# Patient Record
Sex: MALE | Race: WHITE | URBAN - METROPOLITAN AREA
[De-identification: names, ages, dates, MRNs, and addresses within clinical notes are randomized per-mention and may not be internally consistent; named-entity substitution may affect disease eponyms.]

---

## 2017-10-21 DIAGNOSIS — B00.9 HSV-2 (HERPES SIMPLEX VIRUS 2) INFECTION: ICD-10-CM

## 2017-10-21 NOTE — LETTER
October 24, 2017    Boni Marshall  [address]  [place] [address]        Dear Boni,       We recently received a refill request for acyclovir (ZOVIRAX) 200 MG capsule.  We have refilled this for a one time refill only because you are overdue for a:    HSV-2 office visit      Please call at your earliest convenience so that there will not be a delay with your future refills.          Thank you,   Your [facility] Team/jonnathan  [phone]

## 2017-10-23 RX ORDER — ACYCLOVIR 200 MG/1
CAPSULE ORAL
Qty: 25 CAPSULE | Refills: 0 | Status: SHIPPED | OUTPATIENT
Start: 2017-10-23 | End: 2018-01-10

## 2017-10-23 NOTE — TELEPHONE ENCOUNTER
Medication is being filled for 1 time refill only due to:  Patient needs to be seen because it has been more than one year since last visit.      - please send reminder.     Andria Chandler RN

## 2017-11-06 ENCOUNTER — RADIANT APPOINTMENT (OUTPATIENT)
Dept: GENERAL RADIOLOGY | Facility: CLINIC | Age: 42
End: 2017-11-06
Attending: NURSE PRACTITIONER
Payer: COMMERCIAL

## 2017-11-06 ENCOUNTER — OFFICE VISIT (OUTPATIENT)
Dept: FAMILY MEDICINE | Facility: CLINIC | Age: 42
End: 2017-11-06
Payer: COMMERCIAL

## 2017-11-06 VITALS
HEART RATE: 55 BPM | SYSTOLIC BLOOD PRESSURE: 125 MMHG | TEMPERATURE: 97.6 F | BODY MASS INDEX: 39.84 KG/M2 | WEIGHT: 302 LBS | DIASTOLIC BLOOD PRESSURE: 78 MMHG | RESPIRATION RATE: 15 BRPM | OXYGEN SATURATION: 99 %

## 2017-11-06 DIAGNOSIS — S93.402A SPRAIN OF LEFT ANKLE, UNSPECIFIED LIGAMENT, INITIAL ENCOUNTER: ICD-10-CM

## 2017-11-06 DIAGNOSIS — S39.92XA INJURY OF LOW BACK, INITIAL ENCOUNTER: ICD-10-CM
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
## 2017-11-06 DIAGNOSIS — S13.9XXA NECK SPRAIN, INITIAL ENCOUNTER: ICD-10-CM

## 2017-11-06 PROCEDURE — 70360 X-RAY EXAM OF NECK: CPT

## 2017-11-06 PROCEDURE — 72100 X-RAY EXAM L-S SPINE 2/3 VWS: CPT

## 2017-11-06 PROCEDURE — 73610 X-RAY EXAM OF ANKLE: CPT | Mod: LT

## 2017-11-06 PROCEDURE — 99214 OFFICE O/P EST MOD 30 MIN: CPT | Performed by: NURSE PRACTITIONER

## 2017-11-06 NOTE — PROGRESS NOTES
SUBJECTIVE:   Boni Marshall is a 42 year old male who presents to clinic today for the following health issues:  Fall. He reports slipping down 2 stairs this morning at 430 am. Does not think he hit his head.  Has had no bleeding. No loss of consciousness. He had had moderate headache, back of  neck pain, lower left back pain and left ankle pain. When he fell he twisted ankle and landed on back  He has hx of back surgery for degeneration 10 years ago.   Pain in back is 8/10, constant.  He did vomit twice this morning, thinks it was from pain, nothing since 630 am.   Denies neurologic deficits, blurred vision, dizziness.    Problem list and histories reviewed & adjusted, as indicated.  Additional history: as documented    Patient Active Problem List   Diagnosis     Morbid obesity, unspecified obesity type (H)     Hip pain, left     Knee pain, unspecified laterality     Back strain, initial encounter     Night sweats     Family hx of prostate cancer     Past Surgical History:   Procedure Laterality Date     BACK SURGERY       GASTRIC BYPASS       ORTHOPEDIC SURGERY      L knee surgery per pt      ORTHOPEDIC SURGERY      L hand surgery       Social History   Substance Use Topics     Smoking status: Former Smoker     Smokeless tobacco: Not on file     Alcohol use No     Family History   Problem Relation Age of Onset     DIABETES Father      Prostate Cancer Father      has had twice      Other Cancer Maternal Grandmother      Alcoholism Maternal Grandmother      Other Cancer Maternal Grandfather      Alcoholism Maternal Grandfather      Other Cancer Paternal Grandmother      Other Cancer Paternal Grandfather      Alcoholism Mother      Breast Cancer Maternal Half-Sister      Colon Cancer Paternal Half-Sister              Reviewed and updated as needed this visit by clinical staff       Reviewed and updated as needed this visit by Provider         ROS:  Constitutional, HEENT, cardiovascular, pulmonary, GI, ,  musculoskeletal, neuro, skin, endocrine and psych systems are negative, except as otherwise noted.      OBJECTIVE:   /78  Pulse 55  Temp 97.6  F (36.4  C) (Oral)  Resp 15  Wt (!) 302 lb (137 kg)  SpO2 99%  BMI 39.84 kg/m2  Body mass index is 39.84 kg/(m^2).  GENERAL:  Alert and no distress  EYES: Eyes grossly normal to inspection, PERRL and conjunctivae and sclerae normal  HENT: ear canals and TM's normal, nose and mouth without ulcers or lesions  NECK: no adenopathy and no asymmetry, masses, or scars. Full rom. Tender back of neck on palpation.   RESP: lungs clear to auscultation - no rales, rhonchi or wheezes  CV: regular rate and rhythm, normal S1 S2, no S3 or S4, no murmur, click or rub, no peripheral edema and peripheral pulses strong  ABDOMEN: soft, nontender, no hepatosplenomegaly, no masses and bowel sounds normal  BACK: Lumbar spine exam is normal without tenderness. Tender lower left of spine to palpation with bruising. No masses. Full range of motion with pain is noted.  Left ankle has no erythema, ecchymosis, warmth, or edema.  Mild tenderness over the medial aspect of the ankle or the medial ligaments. Foot is neurovasculary intact.  SKIN: no suspicious lesions or rashes  NEURO: Normal strength and tone, mentation intact and speech normal  PSYCH: mentation appears normal, affect normal/bright    XRAY RESULTS:  BACK: There has been prior L4-L5 and L5-S1 fusion. Lateral bone  grafting material is present. Vertebral body heights are maintained.  No listhesis. Surgical clips noted near the gastroesophageal junction.    ANKLE: No fracture, dislocation, or retained radiopaque foreign Body.    NECK: Negative, pending radiology review.     ASSESSMENT/PLAN:     (W10.8XXA) Fall down stairs, initial encounter  (primary encounter diagnosis)    (S13.9XXA) Neck sprain, initial encounter    (S93.402A) Sprain of left ankle, initial encounter    (S39.92XA) Injury of low back, initial encounter    After  reviewing all xrays, no fractures or dislocation identified  Discussed home care, rest, activity as tolerated, ice/heat, ibuprofen  Monitor symptoms if worsening or not improving call or rtc    More than 50% of the 40 minute visit was spent ruling out extensive injuries, coordianating care and counseling on injuries and home care    DREAD Cisneros [facility]

## 2017-11-06 NOTE — PATIENT INSTRUCTIONS
Back Contusion  You have a contusion to your back. A contusion is also called a bruise. There is swelling and some bleeding under the skin. The skin may be purplish. You may have muscle aching and stiffness in the area of the bruise. There are no broken bones.  Contusions heal on their own, without further treatment. However, pain and skin discoloration may take weeks to months to go away.   Home care    Rest. Avoid heavy lifting, strenuous exertion, or any activity that causes pain.    Ice the area to reduce pain and swelling. Put ice cubes in a plastic bag or use a cold pack. (Wrap the cold source in a thin towel. Don't place it directly on your skin.) Ice the injured area for 20 minutes every 1 to 2 hours the first day. Continue with ice packs 3 to 4 times a day for the next 2 days, then as needed for the relief of pain and swelling.    Take any prescribed pain medicine. If none was prescribed, take acetaminophen, ibuprofen, or naproxen to control pain, unless you have other medical conditions that prevent taking these medicines. If you are unsure about medicines, ask your healthcare provider before you leave the hospital.  Follow-up care  Follow up with your healthcare provider, or as directed. Call if you are not better in 1 to 2 weeks.  When to seek medical advice  Call your healthcare provider for any of the following:    New or worsening pain    Increased swelling around the bruise    Pain spreads to one or both legs    Weakness or numbness in one or both legs     Loss of bowel or bladder control    Numbness in the groin or genital area    Fever of 100.4 F (38 C) or higher, or as directed by your healthcare provider  Date Last Reviewed: 7/1/2017 2000-2017 [employer]. [address] [place] [address]. All rights reserved. This information is not intended as a substitute for professional medical care. Always follow your healthcare professional's instructions.         * HEAD INJURY,  no wake-up (Adult)    You have had a head injury. It does not appear serious at this time. Sometimes symptoms of a more serious problem (concussion, bruising or bleeding in the brain) may appear later. Therefore, watch for the warning signs listed below.  HOME CARE:    During the next 24 hours someone must stay with you to check for the signs below. It is not necessary to stay awake or be awakened during the night.    If you have swelling of the face or scalp, apply an ice pack (ice cubes in a plastic bag, wrapped in a towel) for 20 minutes. Do this every 1-2 hours until the swelling starts to go down.    You may use acetaminophen (Tylenol) 650-1000 mg every 6 hours or ibuprofen (Motrin, Advil) 600 mg every 6-8 hours with food to control pain, if you are able to take these medicines. [NOTE: If you have chronic liver or kidney disease or ever had a stomach ulcer or GI bleeding, talk with your doctor before using these medicines.] Do not take aspirin after a head injury.    For the next 24 hours:    Do not take alcohol, sedatives or medicines that make you sleepy.    Do not drive or operate machinery.    Avoid strenuous activities. No lifting or straining.    If you have had any symptoms of a concussion today (nausea, vomiting, dizziness, confusion, headache, memory loss or if you were knocked out), do not return to sports or any activity that could result in another head injury until 2 full weeks after all symptoms are gone and you have been cleared by your doctor. A second head injury before fully recovering from the first one can lead to serious brain injury.  FOLLOW UP with your doctor if symptoms are not improving after 24 hours, or as directed.  GET PROMPT MEDICAL ATTENTION if any of the following warning signs occur:    Repeated vomiting    Severe or worsening headache or dizziness    Unusual drowsiness, or unable to awaken as usual    Confusion or change in behavior or speech, memory loss, blurred  vision    Convulsion (seizure)    Increasing scalp or face swelling    Redness, warmth or pus from the swollen area    Fluid drainage or bleeding from the nose or ears    1786-2983 [employer]. [address] [place] [address], [place] [address]. All rights reserved. This information is not intended as a substitute for professional medical care. Always follow your healthcare professional's instructions.  This information has been modified by your health care provider with permission from the publisher.

## 2017-11-06 NOTE — NURSING NOTE
"Chief Complaint   Patient presents with     Fall     pt fell doing down the stairs 11/6/17, c/o back, shoulder and headache neck pain       Initial /78  Pulse 55  Temp 97.6  F (36.4  C) (Oral)  Resp 15  Wt (!) 302 lb (137 kg)  SpO2 99%  BMI 39.84 kg/m2 Estimated body mass index is 39.84 kg/(m^2) as calculated from the following:    Height as of 6/30/16: 6' 1\" (1.854 m).    Weight as of this encounter: 302 lb (137 kg).  Medication Reconciliation: complete   Victor Manuel Shields MA      "

## 2017-11-06 NOTE — MR AVS SNAPSHOT
After Visit Summary   11/6/2017    Boni Marshall    MRN: [id number]           Patient Information     Date Of Birth          1975        Visit Information        Provider Department      11/6/2017 12:20 PM Abimbola Shen APRN [facility]        Today's Diagnoses     Fall down stairs, initial encounter    -  1    Neck sprain, initial encounter        Sprain of left ankle, initial encounter        Injury of low back, initial encounter          Care Instructions      Back Contusion  You have a contusion to your back. A contusion is also called a bruise. There is swelling and some bleeding under the skin. The skin may be purplish. You may have muscle aching and stiffness in the area of the bruise. There are no broken bones.  Contusions heal on their own, without further treatment. However, pain and skin discoloration may take weeks to months to go away.   Home care    Rest. Avoid heavy lifting, strenuous exertion, or any activity that causes pain.    Ice the area to reduce pain and swelling. Put ice cubes in a plastic bag or use a cold pack. (Wrap the cold source in a thin towel. Don't place it directly on your skin.) Ice the injured area for 20 minutes every 1 to 2 hours the first day. Continue with ice packs 3 to 4 times a day for the next 2 days, then as needed for the relief of pain and swelling.    Take any prescribed pain medicine. If none was prescribed, take acetaminophen, ibuprofen, or naproxen to control pain, unless you have other medical conditions that prevent taking these medicines. If you are unsure about medicines, ask your healthcare provider before you leave the hospital.  Follow-up care  Follow up with your healthcare provider, or as directed. Call if you are not better in 1 to 2 weeks.  When to seek medical advice  Call your healthcare provider for any of the following:    New or worsening pain    Increased swelling around the bruise    Pain spreads to  one or both legs    Weakness or numbness in one or both legs     Loss of bowel or bladder control    Numbness in the groin or genital area    Fever of 100.4 F (38 C) or higher, or as directed by your healthcare provider  Date Last Reviewed: 7/1/2017 2000-2017 [employer]. [address] [place] [address]. All rights reserved. This information is not intended as a substitute for professional medical care. Always follow your healthcare professional's instructions.         * HEAD INJURY, no wake-up (Adult)    You have had a head injury. It does not appear serious at this time. Sometimes symptoms of a more serious problem (concussion, bruising or bleeding in the brain) may appear later. Therefore, watch for the warning signs listed below.  HOME CARE:    During the next 24 hours someone must stay with you to check for the signs below. It is not necessary to stay awake or be awakened during the night.    If you have swelling of the face or scalp, apply an ice pack (ice cubes in a plastic bag, wrapped in a towel) for 20 minutes. Do this every 1-2 hours until the swelling starts to go down.    You may use acetaminophen (Tylenol) 650-1000 mg every 6 hours or ibuprofen (Motrin, Advil) 600 mg every 6-8 hours with food to control pain, if you are able to take these medicines. [NOTE: If you have chronic liver or kidney disease or ever had a stomach ulcer or GI bleeding, talk with your doctor before using these medicines.] Do not take aspirin after a head injury.    For the next 24 hours:    Do not take alcohol, sedatives or medicines that make you sleepy.    Do not drive or operate machinery.    Avoid strenuous activities. No lifting or straining.    If you have had any symptoms of a concussion today (nausea, vomiting, dizziness, confusion, headache, memory loss or if you were knocked out), do not return to sports or any activity that could result in another head injury until 2 full weeks after all  symptoms are gone and you have been cleared by your doctor. A second head injury before fully recovering from the first one can lead to serious brain injury.  FOLLOW UP with your doctor if symptoms are not improving after 24 hours, or as directed.  GET PROMPT MEDICAL ATTENTION if any of the following warning signs occur:    Repeated vomiting    Severe or worsening headache or dizziness    Unusual drowsiness, or unable to awaken as usual    Confusion or change in behavior or speech, memory loss, blurred vision    Convulsion (seizure)    Increasing scalp or face swelling    Redness, warmth or pus from the swollen area    Fluid drainage or bleeding from the nose or ears    3030-7510 [employer]. [address] [place] [address]. All rights reserved. This information is not intended as a substitute for professional medical care. Always follow your healthcare professional's instructions.  This information has been modified by your health care provider with permission from the publisher.            Follow-ups after your visit        Who to contact     If you have questions or need follow up information about today's clinic visit or your schedule please contact [facility] directly at [phone].  Normal or non-critical lab and imaging results will be communicated to you by [employer], letter or phone within 4 business days after the clinic has received the results. If you do not hear from us within 7 days, please contact the clinic through [employer] or phone. If you have a critical or abnormal lab result, we will notify you by phone as soon as possible.  Submit refill requests through [employer] or call your pharmacy and they will forward the refill request to us. Please allow 3 business days for your refill to be completed.          Additional Information About Your Visit        [employer] Information     [employer] gives you secure access to your electronic health record. If you see a primary  care provider, you can also send messages to your care team and make appointments. If you have questions, please call your primary care clinic.  If you do not have a primary care provider, please call [phone] and they will assist you.        Care EveryWhere ID     This is your Care EveryWhere ID. This could be used by other organizations to access your [place] medical records  [id number]        Your Vitals Were     Pulse Temperature Respirations Pulse Oximetry BMI (Body Mass Index)       55 97.6  F (36.4  C) (Oral) 15 99% 39.84 kg/m2        Blood Pressure from Last 3 Encounters:   11/06/17 125/78   07/27/16 127/80   06/30/16 108/80    Weight from Last 3 Encounters:   11/06/17 (!) 302 lb (137 kg)   07/27/16 (!) 308 lb 12.8 oz (140.1 kg)   06/30/16 (!) 307 lb (139.3 kg)               Primary Care Provider Office Phone # Fax #    Corinna Lofton [phone] [address] [facility] [address]        Equal Access to Services     [facility] : Hadii aad ku hadasho Soshelliali, waaxda luqadaha, qaybta kaalmada adeegyada, ayden moore . So [facility] [phone].    ATENCIÓN: Si habla español, tiene a parsons disposición servicios gratuitos de asistencia lingüística. Llame al [phone].    We comply with applicable federal civil rights laws and [place] laws. We do not discriminate on the basis of race, color, national origin, age, disability, sex, sexual orientation, or gender identity.            Thank you!     Thank you for choosing [facility]  for your care. Our goal is always to provide you with excellent care. Hearing back from our patients is one way we can continue to improve our services. Please take a few minutes to complete the written survey that you may receive in the mail after your visit with us. Thank you!             Your Updated Medication List - Protect others around you: Learn how to safely use, store and throw away your  medicines at www.disposemymeds.org.          This list is accurate as of: 11/6/17  1:50 PM.  Always use your most recent med list.                   Brand Name Dispense Instructions for use Diagnosis    acyclovir 200 MG capsule    ZOVIRAX    25 capsule    TAKE 1 CAPSULE BY MOUTH 5 TIMES DAILY FOR 5 DAYS.    HSV-2 (herpes simplex virus 2) infection       diclofenac 1 % Gel topical gel    VOLTAREN    1 Tube    Place onto the skin 4 times daily To affected area, as needed.    Chronic right shoulder pain

## 2018-01-10 ENCOUNTER — OFFICE VISIT (OUTPATIENT)
Dept: INTERNAL MEDICINE | Facility: CLINIC | Age: 43
End: 2018-01-10
Payer: COMMERCIAL

## 2018-01-10 VITALS
WEIGHT: 296 LBS | HEIGHT: 73 IN | TEMPERATURE: 98.6 F | SYSTOLIC BLOOD PRESSURE: 123 MMHG | HEART RATE: 65 BPM | BODY MASS INDEX: 39.23 KG/M2 | RESPIRATION RATE: 18 BRPM | DIASTOLIC BLOOD PRESSURE: 77 MMHG | OXYGEN SATURATION: 98 %

## 2018-01-10 DIAGNOSIS — B00.9 HSV-2 (HERPES SIMPLEX VIRUS 2) INFECTION: ICD-10-CM

## 2018-01-10 DIAGNOSIS — R07.0 THROAT PAIN: Primary | ICD-10-CM

## 2018-01-10 DIAGNOSIS — B00.9 HSV (HERPES SIMPLEX VIRUS) INFECTION: ICD-10-CM

## 2018-01-10 PROCEDURE — 99214 OFFICE O/P EST MOD 30 MIN: CPT | Performed by: INTERNAL MEDICINE

## 2018-01-10 RX ORDER — ACYCLOVIR 200 MG/1
200 CAPSULE ORAL
Qty: 25 CAPSULE | Refills: 3 | Status: SHIPPED | OUTPATIENT
Start: 2018-01-10

## 2018-01-10 RX ORDER — AZITHROMYCIN 250 MG/1
TABLET, FILM COATED ORAL
Qty: 6 TABLET | Refills: 0 | Status: SHIPPED | OUTPATIENT
Start: 2018-01-10

## 2018-01-10 NOTE — PATIENT INSTRUCTIONS
Treat this as you would treat a regular cold. If your symptoms are not better after a total of 10 days, start to take the antibiotic.  If your symptoms become worse in the next few days, start to take the antibiotic. Otherwise, do not take the antibiotic.    See below:    Self-Care for Sore Throats    Sore throats happen for many reasons, such as colds, allergies, and infections caused by viruses or bacteria. In any case, your throat becomes red and sore. Your goal for self-care is to reduce your discomfort while giving your throat a chance to heal.  Moisten and soothe your throat  Tips include the following:    Try a sip of water first thing after waking up.    Keep your throat moist by drinking 6 or more glasses of clear liquids every day.    Run a cool-air humidifier in your room overnight.    Avoid cigarette smoke.     Suck on throat lozenges, cough drops, hard candy, ice chips, or frozen fruit-juice bars. Use the sugar-free versions if your diet or medical condition requires them.  Gargle to ease irritation  Gargling every hour or 2 can ease irritation. Try gargling with 1 of these solutions:    1/4 teaspoon of salt in 1/2 cup of warm water    An over-the-counter anesthetic gargle  Use medicine for more relief  Over-the-counter medicine can reduce sore throat symptoms. Ask your pharmacist if you have questions about which medicine to use:    Ease pain with anesthetic sprays. Aspirin or an aspirin substitute also helps. Remember, never give aspirin to anyone 18 or younger, or if you are already taking blood thinners.     For sore throats caused by allergies, try antihistamines to block the allergic reaction.    Remember: unless a sore throat is caused by a bacterial infection, antibiotics won t help you.  Prevent future sore throats  Prevention tips include the following:    Stop smoking or reduce contact with secondhand smoke. Smoke irritates the tender throat lining.    Limit contact with pets and with  allergy-causing substances, such as pollen and mold.    When you re around someone with a sore throat or cold, wash your hands often to keep viruses or bacteria from spreading.    Don t strain your vocal cords.  Call your healthcare provider  Contact your healthcare provider if you have:    A temperature over 101 F (38.3 C)    White spots on the throat    Great difficulty swallowing    Trouble breathing    A skin rash    Recent exposure to someone else with strep bacteria    Severe hoarseness and swollen glands in the neck or jaw   Date Last Reviewed: 8/1/2016 2000-2017 [employer]. [address] [place] [address]. All rights reserved. This information is not intended as a substitute for professional medical care. Always follow your healthcare professional's instructions.

## 2018-01-10 NOTE — MR AVS SNAPSHOT
After Visit Summary   1/10/2018    Boni Marshall    MRN: [id number]           Patient Information     Date Of Birth          1975        Visit Information        Provider Department      1/10/2018 8:40 AM Corinna Lofton MD [facility]        Today's Diagnoses     Throat pain    -  1      Care Instructions    Treat this as you would treat a regular cold. If your symptoms are not better after a total of 10 days, start to take the antibiotic.  If your symptoms become worse in the next few days, start to take the antibiotic. Otherwise, do not take the antibiotic.    See below:    Self-Care for Sore Throats    Sore throats happen for many reasons, such as colds, allergies, and infections caused by viruses or bacteria. In any case, your throat becomes red and sore. Your goal for self-care is to reduce your discomfort while giving your throat a chance to heal.  Moisten and soothe your throat  Tips include the following:    Try a sip of water first thing after waking up.    Keep your throat moist by drinking 6 or more glasses of clear liquids every day.    Run a cool-air humidifier in your room overnight.    Avoid cigarette smoke.     Suck on throat lozenges, cough drops, hard candy, ice chips, or frozen fruit-juice bars. Use the sugar-free versions if your diet or medical condition requires them.  Gargle to ease irritation  Gargling every hour or 2 can ease irritation. Try gargling with 1 of these solutions:    1/4 teaspoon of salt in 1/2 cup of warm water    An over-the-counter anesthetic gargle  Use medicine for more relief  Over-the-counter medicine can reduce sore throat symptoms. Ask your pharmacist if you have questions about which medicine to use:    Ease pain with anesthetic sprays. Aspirin or an aspirin substitute also helps. Remember, never give aspirin to anyone 18 or younger, or if you are already taking blood thinners.     For sore throats caused by  allergies, try antihistamines to block the allergic reaction.    Remember: unless a sore throat is caused by a bacterial infection, antibiotics won t help you.  Prevent future sore throats  Prevention tips include the following:    Stop smoking or reduce contact with secondhand smoke. Smoke irritates the tender throat lining.    Limit contact with pets and with allergy-causing substances, such as pollen and mold.    When you re around someone with a sore throat or cold, wash your hands often to keep viruses or bacteria from spreading.    Don t strain your vocal cords.  Call your healthcare provider  Contact your healthcare provider if you have:    A temperature over 101 F (38.3 C)    White spots on the throat    Great difficulty swallowing    Trouble breathing    A skin rash    Recent exposure to someone else with strep bacteria    Severe hoarseness and swollen glands in the neck or jaw   Date Last Reviewed: 8/1/2016 2000-2017 [employer]. [address] [place] [address]. All rights reserved. This information is not intended as a substitute for professional medical care. Always follow your healthcare professional's instructions.                Follow-ups after your visit        Who to contact     If you have questions or need follow up information about today's clinic visit or your schedule please contact [facility] directly at [phone].  Normal or non-critical lab and imaging results will be communicated to you by MyChart, letter or phone within 4 business days after the clinic has received the results. If you do not hear from us within 7 days, please contact the clinic through MyChart or phone. If you have a critical or abnormal lab result, we will notify you by phone as soon as possible.  Submit refill requests through [employer] or call your pharmacy and they will forward the refill request to us. Please allow 3 business days for your refill to be completed.           "Additional Information About Your Visit        MyChart Information     [employer] gives you secure access to your electronic health record. If you see a primary care provider, you can also send messages to your care team and make appointments. If you have questions, please call your primary care clinic.  If you do not have a primary care provider, please call [phone] and they will assist you.        Care EveryWhere ID     This is your Care EveryWhere ID. This could be used by other organizations to access your [facility] medical records  [id number]        Your Vitals Were     Pulse Temperature Respirations Height Pulse Oximetry BMI (Body Mass Index)    65 98.6  F (37  C) (Oral) 18 6' 1\" (1.854 m) 98% 39.05 kg/m2       Blood Pressure from Last 3 Encounters:   01/10/18 123/77   11/06/17 125/78   07/27/16 127/80    Weight from Last 3 Encounters:   01/10/18 296 lb (134.3 kg)   11/06/17 (!) 302 lb (137 kg)   07/27/16 (!) 308 lb 12.8 oz (140.1 kg)              Today, you had the following     No orders found for display         Today's Medication Changes          These changes are accurate as of: 1/10/18  9:11 AM.  If you have any questions, ask your nurse or doctor.               Start taking these medicines.        Dose/Directions    azithromycin 250 MG tablet   Commonly known as:  ZITHROMAX   Used for:  Throat pain   Started by:  Corinna Lofton MD        Two tablets first day, then one tablet daily for four days.   Quantity:  6 tablet   Refills:  0            Where to get your medicines      These medications were sent to [facility] - [place], [place] - [address] [facility]  [address] [address]     Phone:  [phone]     azithromycin 250 MG tablet                Primary Care Provider Office Phone # Fax #    Corinna Lofton [phone] [address] [facility] [address]        Equal Access to Services     ERIK SHOMEAKER AH: Radha mariee " chiquis Alexander, wasergioda luadrialora, qaalexsandrata kavaleriano callaway, ayden weller kaiachelsie ladaxcj tiffanie. [facility] [phone].    ATENCIÓN: Si habla nyasia, tiene a parsons disposición servicios gratuitos de asistencia lingüística. Dev al [phone].    We comply with applicable federal civil rights laws and [place] laws. We do not discriminate on the basis of race, color, national origin, age, disability, sex, sexual orientation, or gender identity.            Thank you!     [facility] you [facility]  [facility]. Our goal is always to provide you with excellent care. Hearing back from our patients is one way we can continue to improve our services. Please take a few minutes to complete the written survey that you may receive in the mail after your visit with us. Thank you!             Your Updated Medication List - Protect others around you: Learn how to safely use, store and throw away your medicines at www.disposemymeds.org.          This list is accurate as of: 1/10/18  9:11 AM.  Always use your most recent med list.                   Brand Name Dispense Instructions for use Diagnosis    acyclovir 200 MG capsule    ZOVIRAX    25 capsule    TAKE 1 CAPSULE BY MOUTH 5 TIMES DAILY FOR 5 DAYS.    HSV-2 (herpes simplex virus 2) infection       azithromycin 250 MG tablet    ZITHROMAX    6 tablet    Two tablets first day, then one tablet daily for four days.    Throat pain       diclofenac 1 % Gel topical gel    VOLTAREN    1 Tube    Place onto the skin 4 times daily To affected area, as needed.    Chronic right shoulder pain

## 2018-01-10 NOTE — PROGRESS NOTES
SUBJECTIVE:   Boni Marshall is a 42 year old male who presents to clinic today for the following health issues:      ENT Symptoms             Symptoms: cc Present Absent Comment   Fever/Chills  x     Fatigue  x     Muscle Aches  x  Neck pain started yesterday    Eye Irritation  x  improved   Sneezing  x     Nasal Shorty/Drg  x  Started as clear then yellowish, and now clear again   Sinus Pressure/Pain   x    Loss of smell  x     Dental pain   x    Sore Throat  x  This has gotten worse as this illness has progressed.   Swollen Glands   x    Ear Pain/Fullness   x    Cough  x  Started as clear then yellowish, and now clear again   Wheeze  x     Chest Pain  x     Shortness of breath  x     Rash   x    Other    Also a left-sided neck soreness which began yesterday.      Symptom duration:    5 days   Symptom severity:  moderate   Treatments tried:  dayquil, nyquil   Contacts:  work, wife had flu weeks ago                Reviewed and updated as needed this visit by clinical staffTobacco  Allergies  Meds  Problems       Reviewed and updated as needed this visit by Provider  Meds  Problems           Patient Active Problem List   Diagnosis     Morbid obesity, unspecified obesity type (H)     Hip pain, left     Knee pain, unspecified laterality     Back strain, initial encounter     Night sweats     Family hx of prostate cancer     HSV (herpes simplex virus) infection       Past Medical History:   Diagnosis Date     NO ACTIVE PROBLEMS        Past Surgical History:   Procedure Laterality Date     BACK SURGERY       GASTRIC BYPASS       ORTHOPEDIC SURGERY      L knee surgery per pt      ORTHOPEDIC SURGERY      L hand surgery       Family History   Problem Relation Age of Onset     DIABETES Father      Prostate Cancer Father      has had twice      Other Cancer Maternal Grandmother      Alcoholism Maternal Grandmother      Other Cancer Maternal Grandfather      Alcoholism Maternal Grandfather      Other Cancer  "Paternal Grandmother      Other Cancer Paternal Grandfather      Alcoholism Mother      Breast Cancer Maternal Half-Sister      Colon Cancer Paternal Half-Sister        Social History   Substance Use Topics     Smoking status: Former Smoker     Smokeless tobacco: Never Used     Alcohol use No       Current Outpatient Prescriptions   Medication     azithromycin (ZITHROMAX) 250 MG tablet     acyclovir (ZOVIRAX) 200 MG capsule     diclofenac (VOLTAREN) 1 % GEL     No current facility-administered medications for this visit.          ROS:  Constitutional, HEENT, cardiovascular, pulmonary, GI, , musculoskeletal, neuro, skin, endocrine and psych systems are negative, except as otherwise noted.     OBJECTIVE:                                                    /77  Pulse 65  Temp 98.6  F (37  C) (Oral)  Resp 18  Ht 6' 1\" (1.854 m)  Wt 296 lb (134.3 kg)  SpO2 98%  BMI 39.05 kg/m2     GENERAL APPEARANCE: healthy, alert and in no distress  EYES: Eyes grossly normal to inspection, and conjunctivae and sclerae normal  HENT: head normocephalic and atraumatic and mouth without ulcers or lesions, oropharynx clear and oral mucous membranes moist  NECK: no noticeable adenopathy, no asymmetry, masses, or scars   RESP: lungs clear to auscultation - no rales, rhonchi or wheezes  CV: regular rate and rhythm, normal S1 S2, no S3 or S4, no murmur, click or rub, no peripheral edema and peripheral pulses strong  ABDOMEN: soft, nontender, no hepatosplenomegaly, no masses and bowel sounds normal  MS: no musculoskeletal defects are noted and gait is age appropriate without ataxia  SKIN: no suspicious lesions or rashes  NEURO: mentation intact and speech normal  PSYCH: mentation appears normal and affect normal/bright.    Results for orders placed or performed in visit on 11/06/17   XR Neck Soft Tissue    Narrative    2 views of the cervical spine 11/6/2017 1:09 PM    COMPARISON: None.    HISTORY: Fell down stairs.      Impression "    IMPRESSION: Vertebral heights are preserved without evidence of  fracture. No significant listhesis identified at any level. No  prevertebral soft tissue swelling.    AYE CARLOS MD       No results found for this or any previous visit (from the past 744 hour(s)).      ASSESSMENT/PLAN:                                                        ICD-10-CM    1. Throat pain R07.0 azithromycin (ZITHROMAX) 250 MG tablet   2. HSV-2 (herpes simplex virus 2) infection B00.9 acyclovir (ZOVIRAX) 200 MG capsule   3. HSV (herpes simplex virus) infection B00.9      1: c/w viral URTI. PLAN: As per orders above and patient instructions below.    2,3: history of this, no current flare-ups. PLAN:  As per orders above and patient instructions below.    Patient Instructions     Treat this as you would treat a regular cold. If your symptoms are not better after a total of 10 days, start to take the antibiotic.  If your symptoms become worse in the next few days, start to take the antibiotic. Otherwise, do not take the antibiotic.    See below:    Self-Care for Sore Throats    Sore throats happen for many reasons, such as colds, allergies, and infections caused by viruses or bacteria. In any case, your throat becomes red and sore. Your goal for self-care is to reduce your discomfort while giving your throat a chance to heal.  Moisten and soothe your throat  Tips include the following:    Try a sip of water first thing after waking up.    Keep your throat moist by drinking 6 or more glasses of clear liquids every day.    Run a cool-air humidifier in your room overnight.    Avoid cigarette smoke.     Suck on throat lozenges, cough drops, hard candy, ice chips, or frozen fruit-juice bars. Use the sugar-free versions if your diet or medical condition requires them.  Gargle to ease irritation  Gargling every hour or 2 can ease irritation. Try gargling with 1 of these solutions:    1/4 teaspoon of salt in 1/2 cup of warm water    An  over-the-counter anesthetic gargle  Use medicine for more relief  Over-the-counter medicine can reduce sore throat symptoms. Ask your pharmacist if you have questions about which medicine to use:    Ease pain with anesthetic sprays. Aspirin or an aspirin substitute also helps. Remember, never give aspirin to anyone 18 or younger, or if you are already taking blood thinners.     For sore throats caused by allergies, try antihistamines to block the allergic reaction.    Remember: unless a sore throat is caused by a bacterial infection, antibiotics won t help you.  Prevent future sore throats  Prevention tips include the following:    Stop smoking or reduce contact with secondhand smoke. Smoke irritates the tender throat lining.    Limit contact with pets and with allergy-causing substances, such as pollen and mold.    When you re around someone with a sore throat or cold, wash your hands often to keep viruses or bacteria from spreading.    Don t strain your vocal cords.  Call your healthcare provider  Contact your healthcare provider if you have:    A temperature over 101 F (38.3 C)    White spots on the throat    Great difficulty swallowing    Trouble breathing    A skin rash    Recent exposure to someone else with strep bacteria    Severe hoarseness and swollen glands in the neck or jaw   Date Last Reviewed: 8/1/2016 2000-2017 [employer]. [address] [place] [address], [place] [address]. All rights reserved. This information is not intended as a substitute for professional medical care. Always follow your healthcare professional's instructions.            Corinna Lofton MD    [address] [address] [phone]

## 2019-10-01 ENCOUNTER — HEALTH MAINTENANCE LETTER (OUTPATIENT)
Age: 44
End: 2019-10-01

## 2021-01-15 ENCOUNTER — HEALTH MAINTENANCE LETTER (OUTPATIENT)
Age: 46
End: 2021-01-15

## 2021-09-04 ENCOUNTER — HEALTH MAINTENANCE LETTER (OUTPATIENT)
Age: 46
End: 2021-09-04

## 2022-02-19 ENCOUNTER — HEALTH MAINTENANCE LETTER (OUTPATIENT)
Age: 47
End: 2022-02-19

## 2022-10-16 ENCOUNTER — HEALTH MAINTENANCE LETTER (OUTPATIENT)
Age: 47
End: 2022-10-16

## 2023-04-01 ENCOUNTER — HEALTH MAINTENANCE LETTER (OUTPATIENT)
Age: 48
End: 2023-04-01